# Patient Record
Sex: MALE | Race: WHITE | NOT HISPANIC OR LATINO
[De-identification: names, ages, dates, MRNs, and addresses within clinical notes are randomized per-mention and may not be internally consistent; named-entity substitution may affect disease eponyms.]

---
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
## 2023-06-12 ENCOUNTER — APPOINTMENT (OUTPATIENT)
Dept: CARDIOLOGY | Facility: CLINIC | Age: 18
End: 2023-06-12
Payer: COMMERCIAL

## 2023-06-12 ENCOUNTER — NON-APPOINTMENT (OUTPATIENT)
Age: 18
End: 2023-06-12

## 2023-06-12 VITALS
DIASTOLIC BLOOD PRESSURE: 80 MMHG | SYSTOLIC BLOOD PRESSURE: 133 MMHG | HEIGHT: 69 IN | BODY MASS INDEX: 32.14 KG/M2 | OXYGEN SATURATION: 98 % | HEART RATE: 71 BPM | WEIGHT: 217 LBS

## 2023-06-12 VITALS
BODY MASS INDEX: 32.14 KG/M2 | SYSTOLIC BLOOD PRESSURE: 133 MMHG | HEART RATE: 71 BPM | OXYGEN SATURATION: 98 % | HEIGHT: 69 IN | DIASTOLIC BLOOD PRESSURE: 80 MMHG | WEIGHT: 217 LBS

## 2023-06-12 DIAGNOSIS — R00.2 PALPITATIONS: ICD-10-CM

## 2023-06-12 DIAGNOSIS — Z83.49 FAMILY HISTORY OF OTHER ENDOCRINE, NUTRITIONAL AND METABOLIC DISEASES: ICD-10-CM

## 2023-06-12 DIAGNOSIS — F12.90 CANNABIS USE, UNSPECIFIED, UNCOMPLICATED: ICD-10-CM

## 2023-06-12 DIAGNOSIS — Z78.9 OTHER SPECIFIED HEALTH STATUS: ICD-10-CM

## 2023-06-12 DIAGNOSIS — E78.5 HYPERLIPIDEMIA, UNSPECIFIED: ICD-10-CM

## 2023-06-12 DIAGNOSIS — E66.9 OBESITY, UNSPECIFIED: ICD-10-CM

## 2023-06-12 DIAGNOSIS — Z82.49 FAMILY HISTORY OF ISCHEMIC HEART DISEASE AND OTHER DISEASES OF THE CIRCULATORY SYSTEM: ICD-10-CM

## 2023-06-12 DIAGNOSIS — R07.9 CHEST PAIN, UNSPECIFIED: ICD-10-CM

## 2023-06-12 PROBLEM — Z00.00 ENCOUNTER FOR PREVENTIVE HEALTH EXAMINATION: Noted: 2023-06-12

## 2023-06-12 PROCEDURE — 93246 EXT ECG>7D<15D RECORDING: CPT

## 2023-06-12 PROCEDURE — 99204 OFFICE O/P NEW MOD 45 MIN: CPT | Mod: 25

## 2023-06-12 PROCEDURE — 93000 ELECTROCARDIOGRAM COMPLETE: CPT | Mod: 59

## 2023-06-13 PROBLEM — Z82.49 FAMILY HISTORY OF ACUTE MYOCARDIAL INFARCTION: Status: ACTIVE | Noted: 2023-06-13

## 2023-06-13 PROBLEM — Z83.49 FAMILY HISTORY OF THYROID DISEASE: Status: ACTIVE | Noted: 2023-06-13

## 2023-06-13 PROBLEM — Z78.9 ELECTRONIC CIGARETTE USE: Status: ACTIVE | Noted: 2023-06-13

## 2023-06-13 PROBLEM — Z78.9 SOCIAL ALCOHOL USE: Status: ACTIVE | Noted: 2023-06-13

## 2023-06-13 RX ORDER — CETIRIZINE HCL 10 MG
TABLET ORAL
Refills: 0 | Status: ACTIVE | COMMUNITY

## 2023-06-13 RX ORDER — IBUPROFEN 800 MG
TABLET ORAL
Refills: 0 | Status: ACTIVE | COMMUNITY

## 2023-06-14 ENCOUNTER — NON-APPOINTMENT (OUTPATIENT)
Age: 18
End: 2023-06-14

## 2023-06-14 ENCOUNTER — APPOINTMENT (OUTPATIENT)
Dept: CARDIOLOGY | Facility: CLINIC | Age: 18
End: 2023-06-14

## 2023-06-18 PROBLEM — R07.9 CHEST PAIN: Status: ACTIVE | Noted: 2023-06-13

## 2023-06-18 PROBLEM — Z82.49 FAMILY HISTORY OF HYPERTROPHIC CARDIOMYOPATHY: Status: ACTIVE | Noted: 2023-06-18

## 2023-06-18 PROBLEM — E66.9 OBESITY: Status: ACTIVE | Noted: 2023-06-18

## 2023-06-18 PROBLEM — F12.90 MARIJUANA USE: Status: ACTIVE | Noted: 2023-06-18

## 2023-06-18 PROBLEM — R00.2 PALPITATIONS: Status: ACTIVE | Noted: 2023-06-12

## 2023-06-18 NOTE — REVIEW OF SYSTEMS
[Feeling Fatigued] : feeling fatigued [Palpitations] : palpitations [Joint Pain] : joint pain [Negative] : Heme/Lymph [FreeTextEntry3] : glasses [FreeTextEntry5] : see  history of present illness

## 2023-06-18 NOTE — DISCUSSION/SUMMARY
[FreeTextEntry1] : Chest pain\par The working diagnosis is noncardiac musculoskeletal chest pain.  The history is consistent with this diagnosis.  The differential diagnosis would include pericarditis.  However the heart rate is normal and the physical examination is normal without any pericardial friction rub.  There is a low suspicion for ischemic heart disease in a 17-year-old patient.\par \par I have recommended the following\par a.  Echocardiogram\par b.  Routine laboratory studies including lipid profile\par \par \par \par Palpitations\par The working diagnosis is palpitations secondary to sinus tachycardia.  The history is consistent with this diagnosis.  In the setting of presumed normal left ventricular systolic function the risk for the development of a malignant ventricular arrhythmia is low.  Nonsustained atrial or ventricular ectopy could conceivably be contributing to the patient's presentation .  An electrocardiogram during symptoms would be most helpful diagnosis.\par \par \par I have recommended the following\par a.  Zio patch/mobile telemetry study\par b.  Laboratory studies are ordered to include metabolic parameters CBC being met and thyroid function tests\par \par Marijuana use/e-cigarette use\par The use of marijuana and electronic cigarettes exacerbate any cardiovascular issues.  Complete cessation is advised..\par \par \par Obesity\par Obesity represents a significant health threat.  Today Mr. Turner is 5 feet 9 inches tall and weighs 217 pounds.  Diet exercise and weight loss are advised.\par \par \par \par The diagnosis, prognosis, risks, options and alternatives were explained at length to the patient.  All questions were answered.  Issues discussed included chest pain atherosclerotic heart disease palpitations cardiac arrhythmias noninvasive cardiac testing cessation of smoking and marijuana use diet exercise and weight loss.

## 2023-06-18 NOTE — PHYSICAL EXAM
[Normal] : normal venous pressure, no carotid bruit [Normal S1, S2] : normal S1, S2 [Clear Lung Fields] : clear lung fields [Soft] : abdomen soft [Non Tender] : non-tender [Normal Bowel Sounds] : normal bowel sounds [Normal Gait] : normal gait [No Rash] : no rash [No Focal Deficits] : no focal deficits [de-identified] : appears overweight in no distress lying flat [de-identified] : normocephalic [de-identified] : no  carotid  bruit.  no jugular venous distention [de-identified] : no murmur.  no gallop.  no diastolic sounds.  No pericardial friction rub [de-identified] : full rang e of motion [de-identified] : dorsalis pedis pulse +2 bilaterally.  no edema.  feet warm well perfused [de-identified] : pleasant

## 2023-06-18 NOTE — HISTORY OF PRESENT ILLNESS
[FreeTextEntry1] : 17 year old man\par cardiology consultation requested because of palpitations and chest pain.\par \par Mr Turner has no known heart disease. There is no history of a myocardial infarction, angina or arrhythmia.\par \par Zelalem experienced anterior chest pain at rest which increased in severity with deep breathing.  There was no associated diaphoresis or nausea.  In addition he has had episodes of "skipping heartbeat."  Zelalem describes 1 episode of chest pain with associated feeling of not being able to see following the use of marijuana..  No shortness of breath at rest.  No syncope.\par \par \par His grandfather reportedly  from a myocardial infarction at age 29..  His father was diagnosed as having hypertrophic cardiomyopathy had a myocardial infarction.  He smokes electronic cigarettes.  There is no history of diabetes hyperlipidemia or hypertension.  He drinks alcohol socially.  There is no history of illicit drug use\par \par \par Mr. Turner presents today for cardiovascular evaluation.\par \par \par \par \par \par \par

## 2023-06-19 PROCEDURE — 93248 EXT ECG>7D<15D REV&INTERPJ: CPT

## 2023-07-04 ENCOUNTER — NON-APPOINTMENT (OUTPATIENT)
Age: 18
End: 2023-07-04

## 2024-05-17 ENCOUNTER — APPOINTMENT (OUTPATIENT)
Dept: INTERNAL MEDICINE | Facility: CLINIC | Age: 19
End: 2024-05-17
Payer: COMMERCIAL

## 2024-05-17 ENCOUNTER — NON-APPOINTMENT (OUTPATIENT)
Age: 19
End: 2024-05-17

## 2024-05-17 VITALS
HEART RATE: 76 BPM | BODY MASS INDEX: 32.44 KG/M2 | WEIGHT: 219 LBS | RESPIRATION RATE: 18 BRPM | DIASTOLIC BLOOD PRESSURE: 80 MMHG | SYSTOLIC BLOOD PRESSURE: 130 MMHG | HEIGHT: 69 IN | OXYGEN SATURATION: 96 %

## 2024-05-17 DIAGNOSIS — Z00.00 ENCOUNTER FOR GENERAL ADULT MEDICAL EXAMINATION W/OUT ABNORMAL FINDINGS: ICD-10-CM

## 2024-05-17 PROCEDURE — 99385 PREV VISIT NEW AGE 18-39: CPT

## 2024-05-17 PROCEDURE — 36415 COLL VENOUS BLD VENIPUNCTURE: CPT

## 2024-05-17 PROCEDURE — 93000 ELECTROCARDIOGRAM COMPLETE: CPT

## 2024-05-17 NOTE — HISTORY OF PRESENT ILLNESS
[de-identified] : Patient comes to this office for the first time.  Last exam for physical was 3 years ago at which time there were no changes to medical care needs.  More recently patient has been seen by cardiology for nonspecific anterior chest pain with essentially negative evaluation so far by Holter and physical exam.  Pending echocardiogram. Overall changes in exertional or functional ability.  No limitations due to.  Nonspecific chest discomfort. No other acute complaints.

## 2024-05-17 NOTE — HEALTH RISK ASSESSMENT
[Good] : ~his/her~  mood as  good [Yes] : Yes [Monthly or less (1 pt)] : Monthly or less (1 point) [1 or 2 (0 pts)] : 1 or 2 (0 points) [Never (0 pts)] : Never (0 points) [No] : In the past 12 months have you used drugs other than those required for medical reasons? No [Little interest or pleasure doing things] : 1) Little interest or pleasure doing things [Feeling down, depressed, or hopeless] : 2) Feeling down, depressed, or hopeless [0] : 2) Feeling down, depressed, or hopeless: Not at all (0) [PHQ-2 Negative - No further assessment needed] : PHQ-2 Negative - No further assessment needed [Current] : Current [HIV Test offered] : HIV Test offered [Hepatitis C test declined] : Hepatitis C test declined [0-4] : 0-4 [Audit-CScore] : 1 [[id number]] : 0

## 2024-05-17 NOTE — COUNSELING

## 2024-05-17 NOTE — PHYSICAL EXAM
[Normal Male:] : meatus normal, the bladder was normal on palpation, the scrotum was normal, there were no testicular masses and no prostate nodules. [Normal] : normal gait, coordination grossly intact, no focal deficits

## 2024-05-20 LAB
ALBUMIN SERPL ELPH-MCNC: 4.7 G/DL
ALP BLD-CCNC: 117 U/L
ALT SERPL-CCNC: 49 U/L
ANION GAP SERPL CALC-SCNC: 13 MMOL/L
AST SERPL-CCNC: 24 U/L
BASOPHILS # BLD AUTO: 0.04 K/UL
BASOPHILS NFR BLD AUTO: 0.6 %
BILIRUB SERPL-MCNC: 0.6 MG/DL
BUN SERPL-MCNC: 14 MG/DL
C TRACH RRNA SPEC QL NAA+PROBE: NOT DETECTED
CALCIUM SERPL-MCNC: 9.9 MG/DL
CHLORIDE SERPL-SCNC: 101 MMOL/L
CHOLEST SERPL-MCNC: 153 MG/DL
CO2 SERPL-SCNC: 24 MMOL/L
CREAT SERPL-MCNC: 0.98 MG/DL
EGFR: 113 ML/MIN/1.73M2
EOSINOPHIL # BLD AUTO: 0.06 K/UL
EOSINOPHIL NFR BLD AUTO: 0.9 %
ESTIMATED AVERAGE GLUCOSE: 120 MG/DL
GLUCOSE SERPL-MCNC: 134 MG/DL
HBA1C MFR BLD HPLC: 5.8 %
HCT VFR BLD CALC: 45.7 %
HDLC SERPL-MCNC: 46 MG/DL
HGB BLD-MCNC: 15.4 G/DL
HIV1+2 AB SPEC QL IA.RAPID: NONREACTIVE
IMM GRANULOCYTES NFR BLD AUTO: 0.3 %
LDLC SERPL CALC-MCNC: 86 MG/DL
LYMPHOCYTES # BLD AUTO: 1.72 K/UL
LYMPHOCYTES NFR BLD AUTO: 26.7 %
MAN DIFF?: NORMAL
MCHC RBC-ENTMCNC: 25.8 PG
MCHC RBC-ENTMCNC: 33.7 GM/DL
MCV RBC AUTO: 76.7 FL
MONOCYTES # BLD AUTO: 0.47 K/UL
MONOCYTES NFR BLD AUTO: 7.3 %
N GONORRHOEA RRNA SPEC QL NAA+PROBE: NOT DETECTED
NEUTROPHILS # BLD AUTO: 4.14 K/UL
NEUTROPHILS NFR BLD AUTO: 64.2 %
NONHDLC SERPL-MCNC: 107 MG/DL
PLATELET # BLD AUTO: 261 K/UL
POTASSIUM SERPL-SCNC: 4 MMOL/L
PROT SERPL-MCNC: 7 G/DL
RBC # BLD: 5.96 M/UL
RBC # FLD: 14.3 %
SODIUM SERPL-SCNC: 138 MMOL/L
SOURCE AMPLIFICATION: NORMAL
T PALLIDUM AB SER QL IA: NEGATIVE
TRIGL SERPL-MCNC: 117 MG/DL
TSH SERPL-ACNC: 1.47 UIU/ML
WBC # FLD AUTO: 6.45 K/UL